# Patient Record
Sex: MALE | Race: WHITE | NOT HISPANIC OR LATINO | URBAN - METROPOLITAN AREA
[De-identification: names, ages, dates, MRNs, and addresses within clinical notes are randomized per-mention and may not be internally consistent; named-entity substitution may affect disease eponyms.]

---

## 2024-10-23 ENCOUNTER — HOSPITAL ENCOUNTER (EMERGENCY)
Facility: HOSPITAL | Age: 29
Discharge: HOME | End: 2024-10-23
Attending: EMERGENCY MEDICINE | Admitting: EMERGENCY MEDICINE
Payer: COMMERCIAL

## 2024-10-23 VITALS
WEIGHT: 256 LBS | BODY MASS INDEX: 36.65 KG/M2 | TEMPERATURE: 98.2 F | SYSTOLIC BLOOD PRESSURE: 149 MMHG | OXYGEN SATURATION: 98 % | HEART RATE: 77 BPM | DIASTOLIC BLOOD PRESSURE: 81 MMHG | RESPIRATION RATE: 16 BRPM | HEIGHT: 70 IN

## 2024-10-23 DIAGNOSIS — T23.422A: Primary | ICD-10-CM

## 2024-10-23 PROCEDURE — 99282 EMERGENCY DEPT VISIT SF MDM: CPT

## 2024-10-23 PROCEDURE — 99283 EMERGENCY DEPT VISIT LOW MDM: CPT | Performed by: EMERGENCY MEDICINE

## 2024-10-23 NOTE — FSED PROVIDER NOTE
Subjective   History of Present Illness  Patient was exposed to a chemical that was basic in nature of phenol he turned his skin weight on his few drops on his fingers he immediately washed it off and it returned to normal patient has no burning sensation or damage to his fingers left hand.      Review of Systems   Skin:  Positive for rash.   All other systems reviewed and are negative.      History reviewed. No pertinent past medical history.    No Known Allergies    History reviewed. No pertinent surgical history.    History reviewed. No pertinent family history.    Social History     Socioeconomic History    Marital status:            Objective   Physical Exam  Vitals and nursing note reviewed.   Constitutional:       General: He is not in acute distress.     Appearance: Normal appearance. He is not ill-appearing, toxic-appearing or diaphoretic.   HENT:      Head: Normocephalic and atraumatic.   Eyes:      Extraocular Movements: Extraocular movements intact.      Pupils: Pupils are equal, round, and reactive to light.   Pulmonary:      Effort: Pulmonary effort is normal.      Breath sounds: Normal breath sounds.   Musculoskeletal:         General: Normal range of motion.      Comments: Fingers of left hand have no burn or abnormality.  They may be slightly hyperemic but they are nontender and no breakdown of skin.  Good cap refill full range of motion   Skin:     General: Skin is warm and dry.      Capillary Refill: Capillary refill takes less than 2 seconds.   Neurological:      Mental Status: He is alert.         Procedures           ED Course                                           Medical Decision Making  Patient had a basic chemical on his skin he washed off immediately there is no permanent burn and he is asymptomatic he may return to work.    Problems Addressed:  Chemical burn of finger of left hand: acute illness or injury        Final diagnoses:   Chemical burn of finger of left hand       ED  Disposition  ED Disposition       ED Disposition   Discharge    Condition   Stable    Comment   --               Provider, No Known  [place] [id number] [phone]      As needed         Medication List      No changes were made to your prescriptions during this visit.

## 2024-10-23 NOTE — Clinical Note
[facility]  [address]  [facility] [id number]    Pradip Recinos was seen and treated in our emergency department on 10/23/2024.  He may return to work on 10/23/2024.  May return to work tonight       Thank you for choosing [facility].    Sulaiman Payan MD